# Patient Record
Sex: MALE | Race: WHITE | Employment: FULL TIME | URBAN - METROPOLITAN AREA
[De-identification: names, ages, dates, MRNs, and addresses within clinical notes are randomized per-mention and may not be internally consistent; named-entity substitution may affect disease eponyms.]

---

## 2022-03-19 PROBLEM — E78.5 DYSLIPIDEMIA: Status: ACTIVE | Noted: 2017-03-08

## 2022-04-04 PROBLEM — J31.0 CHRONIC RHINITIS: Status: ACTIVE | Noted: 2022-04-04

## 2022-04-04 PROBLEM — E29.1 ANDROGEN DEFICIENCY: Status: ACTIVE | Noted: 2022-04-04

## 2022-04-04 PROBLEM — E55.9 VITAMIN D DEFICIENCY: Status: ACTIVE | Noted: 2022-04-04

## 2022-04-04 PROBLEM — M54.50 LOW BACK PAIN: Status: ACTIVE | Noted: 2022-04-04

## 2022-04-04 PROBLEM — E78.00 HYPERCHOLESTEROLEMIA: Status: ACTIVE | Noted: 2022-04-04

## 2022-04-04 PROBLEM — G89.4 CHRONIC PAIN DISORDER: Status: ACTIVE | Noted: 2019-08-22

## 2022-04-04 PROBLEM — M43.16 SPONDYLOLISTHESIS OF LUMBAR REGION: Status: ACTIVE | Noted: 2019-10-29

## 2022-04-04 PROBLEM — F11.90 OPIOID USE DISORDER: Status: ACTIVE | Noted: 2022-02-16

## 2022-08-23 ENCOUNTER — CLINICAL DOCUMENTATION (OUTPATIENT)
Dept: ORTHOPEDIC SURGERY | Age: 52
End: 2022-08-23

## 2022-08-23 ENCOUNTER — TELEPHONE (OUTPATIENT)
Dept: ORTHOPEDIC SURGERY | Age: 52
End: 2022-08-23

## 2022-08-23 DIAGNOSIS — M47.816 SPONDYLOSIS WITHOUT MYELOPATHY OR RADICULOPATHY, LUMBAR REGION: Primary | ICD-10-CM

## 2022-08-23 NOTE — TELEPHONE ENCOUNTER
l    Name: Tammy Molina  YOB: 1970  Gender: male  MRN: [id number]      This patient has requested repeat bilateral lower lumbar facet injections. .    The most recent injection provided 80% pain reduction and improvement in functioning for at least 14 weeks. The patient's current pain scale is 8/10. .    As a result of the current recurrence of pain, the patient is having the following difficulties:  Difficulties with bathing and/or dressing  Difficulty sleeping at night  Difficulty transferring into or out of a car  Difficulty performing housework  Difficulty with sitting or performing work related activities    The patient has severe pain limiting function despite on-going, conservative, physician-guided treatment. The patient is currently regularly engaging in physician or PT directed lumbar spine exercies for 8 weeks.       Taz Bennett MD

## 2022-08-24 ENCOUNTER — CLINICAL DOCUMENTATION (OUTPATIENT)
Dept: ORTHOPEDIC SURGERY | Age: 52
End: 2022-08-24

## 2022-08-24 NOTE — TELEPHONE ENCOUNTER
Called and left message for patient to return my call and schedule repeat injections with [facility]. Ashutosh Geiger has been received and can schedule as early as 8/25.

## 2022-08-30 ENCOUNTER — TELEPHONE (OUTPATIENT)
Dept: ORTHOPEDIC SURGERY | Age: 52
End: 2022-08-30

## 2022-08-31 ENCOUNTER — CLINICAL DOCUMENTATION (OUTPATIENT)
Dept: ORTHOPEDIC SURGERY | Age: 52
End: 2022-08-31

## 2022-09-02 ENCOUNTER — OFFICE VISIT (OUTPATIENT)
Dept: ORTHOPEDIC SURGERY | Age: 52
End: 2022-09-02
Payer: COMMERCIAL

## 2022-09-02 DIAGNOSIS — M47.816 SPONDYLOSIS WITHOUT MYELOPATHY OR RADICULOPATHY, LUMBAR REGION: Primary | ICD-10-CM

## 2022-09-02 PROCEDURE — 64493 INJ PARAVERT F JNT L/S 1 LEV: CPT | Performed by: PHYSICAL MEDICINE & REHABILITATION

## 2022-09-02 PROCEDURE — 64494 INJ PARAVERT F JNT L/S 2 LEV: CPT | Performed by: PHYSICAL MEDICINE & REHABILITATION

## 2022-09-02 RX ORDER — TRIAMCINOLONE ACETONIDE 40 MG/ML
200 INJECTION, SUSPENSION INTRA-ARTICULAR; INTRAMUSCULAR ONCE
Status: COMPLETED | OUTPATIENT
Start: 2022-09-02 | End: 2022-09-02

## 2022-09-02 RX ADMIN — TRIAMCINOLONE ACETONIDE 200 MG: 40 INJECTION, SUSPENSION INTRA-ARTICULAR; INTRAMUSCULAR at 09:26

## 2022-09-02 NOTE — PROGRESS NOTES
Date: 09/02/22   Name: Anna Grady    Pre-Procedural Diagnosis:    Diagnosis Orders   1. Spondylosis without myelopathy or radiculopathy, lumbar region  FL INJ LUMB/SAC FACET SINGLE LEVEL    XR INJ FACET LUMB SACRAL 2ND LVL          Procedure: Bilateral Facet Joint Injections (2 levels)    Precautions:  [facility] Precautions spine injections: None. Patient denies any prior sensitivity to steroid, local anesthetic, contrast dye, iodine or shellfish. The procedure was discussed at length with the patient and informed consent was signed. The patient was placed in a prone position on the fluoroscopy table and the skin was prepped and draped in a routine sterile fashion. The areas to be injected were each anesthetized with approximately 2-3 cc of 1% Lidocaine. Initially a 22-gauge 3.5 inch inch spinal needle was carefully advanced under fluoroscopic guidance to the bilateral L4-L5 and L5-S1 facet joint spaces. 1 cc of 0.25% Marcaine and 50 mg of Kenalog were injected through the spinal needle at each site. Fluoroscopic guidance was used intermittently over a 10-minute period to insure proper needle placement and patient safety. A hard copy of the fluoroscopic  images has been placed in the patient's chart. The patient was monitored after the procedure and discharged home without complication.      Resume Meds:     N/A    Lacey Basurto MD  09/02/22

## 2022-12-05 ENCOUNTER — CLINICAL DOCUMENTATION (OUTPATIENT)
Dept: ORTHOPEDIC SURGERY | Age: 52
End: 2022-12-05

## 2022-12-05 ENCOUNTER — TELEPHONE (OUTPATIENT)
Dept: ORTHOPEDIC SURGERY | Age: 52
End: 2022-12-05

## 2022-12-05 DIAGNOSIS — M47.816 SPONDYLOSIS WITHOUT MYELOPATHY OR RADICULOPATHY, LUMBAR REGION: Primary | ICD-10-CM

## 2022-12-05 NOTE — TELEPHONE ENCOUNTER
l    Name: Edy Sadler  YOB: 1970  Gender: male  MRN: [id number]      This patient has requested repeat bilateral lumbar facet injections. .    The most recent injection provided 80% pain reduction and improvement in functioning for at least 3 months. The patient's current pain scale is 8/10. .    As a result of the current recurrence of pain, the patient is having the following difficulties:  Difficulties with bathing and/or dressing  Difficulty sleeping at night  Difficulty transferring into or out of a car  Difficulty performing housework  Difficulty with sitting or performing work related activities    The patient has severe pain limiting function despite on-going, conservative, physician-guided treatment. The patient is currently regularly engaging in physician or PT directed lumbar spine exercies.       Lu Noland MD

## 2022-12-05 NOTE — LETTER
[facility]  [address] [place] [id number]  Phone: [phone]  Fax: [phone]    Torri Kuhn MD        December 5, 2022    Rinku Ceron [place]  [address]      Dear Jennifer Rocha:       Name: Rinku Valenzuela  YOB: 1970  Gender: male  MRN: [id number]        This patient has requested repeat bilateral lumbar facet injections. ?? .     The most recent injection provided 80% pain reduction and improvement in functioning for at least 3 months.     The patient's current pain scale is 8/10.????? .     As a result of the current recurrence of pain, the patient is having the following difficulties:  Difficulties with bathing and/or dressing  Difficulty sleeping at night  Difficulty transferring into or out of a car  Difficulty performing housework  Difficulty with sitting or performing work related activities     The patient has severe pain limiting function despite on-going, conservative, physician-guided treatment.     The patient is currently regularly engaging in physician or PT directed lumbar spine exercies. If you have any questions or concerns, please don't hesitate to call.     Sincerely,        Torri Kuhn MD

## 2022-12-15 ENCOUNTER — OFFICE VISIT (OUTPATIENT)
Dept: ORTHOPEDIC SURGERY | Age: 52
End: 2022-12-15
Payer: COMMERCIAL

## 2022-12-15 DIAGNOSIS — M47.816 SPONDYLOSIS WITHOUT MYELOPATHY OR RADICULOPATHY, LUMBAR REGION: Primary | ICD-10-CM

## 2022-12-15 PROCEDURE — 64493 INJ PARAVERT F JNT L/S 1 LEV: CPT | Performed by: PHYSICAL MEDICINE & REHABILITATION

## 2022-12-15 PROCEDURE — 64494 INJ PARAVERT F JNT L/S 2 LEV: CPT | Performed by: PHYSICAL MEDICINE & REHABILITATION

## 2022-12-15 RX ORDER — TRIAMCINOLONE ACETONIDE 40 MG/ML
200 INJECTION, SUSPENSION INTRA-ARTICULAR; INTRAMUSCULAR ONCE
Status: COMPLETED | OUTPATIENT
Start: 2022-12-15 | End: 2022-12-15

## 2022-12-15 RX ADMIN — TRIAMCINOLONE ACETONIDE 200 MG: 40 INJECTION, SUSPENSION INTRA-ARTICULAR; INTRAMUSCULAR at 14:03

## 2022-12-15 NOTE — PROGRESS NOTES
Date: 12/15/22   Name: Audie Fung    Pre-Procedural Diagnosis:    Diagnosis Orders   1. Spondylosis without myelopathy or radiculopathy, lumbar region  FL INJ LUMB/SAC FACET SINGLE LEVEL    XR INJ FACET LUMB SACRAL 2ND LVL          Procedure: Bilateral Facet Joint Injections (2 levels)    Precautions:  [facility] Precautions spine injections: None. Patient denies any prior sensitivity to steroid, local anesthetic, contrast dye, iodine or shellfish. The procedure was discussed at length with the patient and informed consent was signed. The patient was placed in a prone position on the fluoroscopy table and the skin was prepped and draped in a routine sterile fashion. The areas to be injected were each anesthetized with approximately 2-3 cc of 1% Lidocaine. Initially a 22-gauge 3.5 inch inch spinal needle was carefully advanced under fluoroscopic guidance to the bilateral L4-L5 and L5-S1 facet joint spaces. 1 cc of 0.25% Marcaine and 50 mg of Kenalog were injected through the spinal needle at each site. Fluoroscopic guidance was used intermittently over a 10-minute period to insure proper needle placement and patient safety. A hard copy of the fluoroscopic  images has been placed in the patient's chart. The patient was monitored after the procedure and discharged home without complication.      Resume Meds:     N/A    Chris Sutherland MD  12/15/22

## 2023-03-06 ENCOUNTER — OFFICE VISIT (OUTPATIENT)
Dept: ORTHOPEDIC SURGERY | Age: 53
End: 2023-03-06
Payer: COMMERCIAL

## 2023-03-06 DIAGNOSIS — M54.50 LUMBAR BACK PAIN: ICD-10-CM

## 2023-03-06 DIAGNOSIS — M47.816 SPONDYLOSIS OF LUMBAR REGION WITHOUT MYELOPATHY OR RADICULOPATHY: Primary | ICD-10-CM

## 2023-03-06 PROCEDURE — 99213 OFFICE O/P EST LOW 20 MIN: CPT | Performed by: PHYSICAL MEDICINE & REHABILITATION

## 2023-03-06 NOTE — PROGRESS NOTES
Date:  03/06/23     HPI:  I am seeing Gallito Mandel in evalution/folowup. He is doing well at this time. He has pain in the lower lumbar paraspinal areas for which facet injections have offered an 90% pain reduction for 3 months or longer. His last set of injections were about 3 months ago and he is doing pretty well. He thinks the pain may come back in the next several weeks but we will see what happens. He has no radicular symptoms and no neurologic issues in the lower extremities. About 10 years ago he had a fall off of a ladder, noting along the way tramadol has produced GI side effects and hydrocodone may have a slight benefit. MR imaging about 10 years ago revealed degenerative changes but a more recent MRI revealed moderate spinal stenosis at several levels but he has done very well. ROS: No new issues since last being seen in the office setting 11 months ago    PE: Alert and cooperative. Good strength in the lower extremities. No lateralizing sensory findings. Has only mild tenderness in lower lumbar paraspinal areas. Ambulates without assistance    Assessment/Plan:  PREDOMINANTLY MUSCULOSKELETAL LUMBOSACRAL  SPINE PAIN. Facet joint arthropathy with excellent responses to facet injections intra-articularly. He would prefer to receive these for now, we have discussed an RFA procedure but he would want to hold off on that unless injections significantly lose patient benefit. He is aware they could offer a longer duration of benefit would be somewhat more technical to receive. I do not think there is a spine surgical issue. We do not need to reimage.     Rinku Reyes MD

## 2023-03-24 ENCOUNTER — CLINICAL DOCUMENTATION (OUTPATIENT)
Dept: ORTHOPEDIC SURGERY | Age: 53
End: 2023-03-24

## 2023-03-24 ENCOUNTER — TELEPHONE (OUTPATIENT)
Dept: ORTHOPEDIC SURGERY | Age: 53
End: 2023-03-24

## 2023-03-24 DIAGNOSIS — M47.816 SPONDYLOSIS WITHOUT MYELOPATHY OR RADICULOPATHY, LUMBAR REGION: ICD-10-CM

## 2023-03-24 DIAGNOSIS — M47.816 SPONDYLOSIS OF LUMBAR REGION WITHOUT MYELOPATHY OR RADICULOPATHY: Primary | ICD-10-CM

## 2023-03-24 DIAGNOSIS — M54.50 LUMBAR BACK PAIN: ICD-10-CM

## 2023-03-31 ENCOUNTER — OFFICE VISIT (OUTPATIENT)
Dept: ORTHOPEDIC SURGERY | Age: 53
End: 2023-03-31

## 2023-03-31 DIAGNOSIS — M47.816 SPONDYLOSIS OF LUMBAR REGION WITHOUT MYELOPATHY OR RADICULOPATHY: Primary | ICD-10-CM

## 2023-03-31 RX ORDER — TRIAMCINOLONE ACETONIDE 40 MG/ML
200 INJECTION, SUSPENSION INTRA-ARTICULAR; INTRAMUSCULAR ONCE
Status: COMPLETED | OUTPATIENT
Start: 2023-03-31 | End: 2023-03-31

## 2023-03-31 RX ADMIN — TRIAMCINOLONE ACETONIDE 200 MG: 40 INJECTION, SUSPENSION INTRA-ARTICULAR; INTRAMUSCULAR at 09:42

## 2023-03-31 NOTE — PROGRESS NOTES
Date: 03/31/23   Name: Rosilyn Mortimer    Pre-Procedural Diagnosis:    Diagnosis Orders   1. Spondylosis of lumbar region without myelopathy or radiculopathy            Procedure: Bilateral Facet Joint Injections (2 levels)    Precautions:  [facility] Precautions spine injections: None. Patient denies any prior sensitivity to steroid, local anesthetic, contrast dye, iodine or shellfish. The procedure was discussed at length with the patient and informed consent was signed. The patient was placed in a prone position on the fluoroscopy table and the skin was prepped and draped in a routine sterile fashion. The areas to be injected were each anesthetized with approximately 2-3 cc of 1% Lidocaine. Initially a 22-gauge 3.5 inch spinal needle was carefully advanced under fluoroscopic guidance to the bilateral L4-L5 and L5-S1 facet joint spaces. 1 cc of 0.25% Marcaine and 50 mg of Kenalog were injected through the spinal needle at each site. Fluoroscopic guidance was used intermittently over a 10-minute period to insure proper needle placement and patient safety. A hard copy of the fluoroscopic  images has been placed in the patient's chart. The patient was monitored after the procedure and discharged home without complication.      Resume Meds:     N/A      Kalie Pisano MD  03/31/23

## 2023-07-03 ENCOUNTER — CLINICAL DOCUMENTATION (OUTPATIENT)
Dept: ORTHOPEDIC SURGERY | Age: 53
End: 2023-07-03

## 2023-07-03 ENCOUNTER — TELEPHONE (OUTPATIENT)
Dept: ORTHOPEDIC SURGERY | Age: 53
End: 2023-07-03

## 2023-07-03 DIAGNOSIS — M47.816 SPONDYLOSIS OF LUMBAR REGION WITHOUT MYELOPATHY OR RADICULOPATHY: Primary | ICD-10-CM

## 2023-07-03 NOTE — TELEPHONE ENCOUNTER
Office/Clinical staff confirmed the PA/PDL paperwork has been faxed to the pharmacy. It is the responsibility of the filling pharmacy to obtain the prior auth. If the office has questions regarding this PA, please contact the patients pharmacy directly.    The EPA team will close out of this encounter at this time.      Scheduled spine injection per patient request at [facility]. He is scheduled on 7/13.  He is aware

## 2023-07-03 NOTE — PROGRESS NOTES
Name: Arelis Jimenez  YOB: 1970  Gender: male  MRN: [id number]        The most recent injection provided 90% pain reduction for at least 4 months. The patient's current pain scale is 8/10. .    As a result of the current recurrence of pain, the patient is having the following difficulties:  Doing his daily activities without pain. The patient has severe pain limiting function despite on-going, conservative, physician-guided treatment. The patient is currently doing home exercise program under Dr Torin Figueroa treatment with no relief.        Mignon Joseph [place]     8/5/8654

## 2023-07-13 ENCOUNTER — OFFICE VISIT (OUTPATIENT)
Dept: ORTHOPEDIC SURGERY | Age: 53
End: 2023-07-13

## 2023-07-13 DIAGNOSIS — M47.816 SPONDYLOSIS OF LUMBAR REGION WITHOUT MYELOPATHY OR RADICULOPATHY: Primary | ICD-10-CM

## 2023-07-13 RX ORDER — TRIAMCINOLONE ACETONIDE 40 MG/ML
200 INJECTION, SUSPENSION INTRA-ARTICULAR; INTRAMUSCULAR ONCE
Status: COMPLETED | OUTPATIENT
Start: 2023-07-13 | End: 2023-07-13

## 2023-07-13 RX ADMIN — TRIAMCINOLONE ACETONIDE 200 MG: 40 INJECTION, SUSPENSION INTRA-ARTICULAR; INTRAMUSCULAR at 15:44

## 2023-07-13 NOTE — PROGRESS NOTES
Date: 07/13/23   Name: Karyn Marcus    Pre-Procedural Diagnosis:    Diagnosis Orders   1. Spondylosis of lumbar region without myelopathy or radiculopathy  FL INJ LUMB/SAC FACET SINGLE LEVEL    XR INJ FACET LUMB SACRAL 2ND LVL          Procedure: Bilateral Facet Joint Injections (2 levels)    Precautions:  [facility] Precautions spine injections: None. Patient denies any prior sensitivity to steroid, local anesthetic, contrast dye, iodine or shellfish. The procedure was discussed at length with the patient and informed consent was signed. The patient was placed in a prone position on the fluoroscopy table and the skin was prepped and draped in a routine sterile fashion. The areas to be injected were each anesthetized with approximately 2-3 cc of 1% Lidocaine. Initially a 22-gauge 3.5 inch spinal needle was carefully advanced under fluoroscopic guidance to the bilateral L4-L5 and L5-S1 facet joint spaces. 1 cc of 0.25% Marcaine and 50 mg of Kenalog were injected through the spinal needle at each site. Fluoroscopic guidance was used intermittently over a 10-minute period to insure proper needle placement and patient safety. A hard copy of the fluoroscopic  images has been placed in the patient's chart. The patient was monitored after the procedure and discharged home without complication. A total of 5 cc of Kenalog were administered during this procedure.     Resume Meds:     N/A    Gertrude Major MD  07/13/23

## 2023-10-10 ENCOUNTER — TELEPHONE (OUTPATIENT)
Dept: ORTHOPEDIC SURGERY | Age: 53
End: 2023-10-10

## 2023-10-10 DIAGNOSIS — M47.816 SPONDYLOSIS OF LUMBAR REGION WITHOUT MYELOPATHY OR RADICULOPATHY: Primary | ICD-10-CM

## 2023-10-10 NOTE — TELEPHONE ENCOUNTER
Called and left  for patient; I went ahead and scheduled his injections for 10/19 GR 2:30pm with [facility]. If this doesn't work for him he will give us a call back to reschedule.

## 2023-10-10 NOTE — TELEPHONE ENCOUNTER
l    Name: Julisa Armenta  YOB: 1970  Gender: male  MRN: [id number]      This patient has requested repeat bilateral lower lumbar facet injections. The most recent injection provided 80% pain reduction and improvement in functioning for at least 3 months. The patient's current pain scale is 8/10. .    As a result of the current recurrence of pain, the patient is having the following difficulties:  Difficulties with bathing and/or dressing  Difficulty sleeping at night  Difficulty transferring into or out of a car  Difficulty performing housework  Difficulty with sitting or performing work related activities    The patient has severe pain limiting function despite on-going, conservative, physician-guided treatment. The patient is currently regularly engaging in physician or PT directed lumbar spine exercies.     Yuliya Perea MD

## 2023-10-19 ENCOUNTER — OFFICE VISIT (OUTPATIENT)
Dept: ORTHOPEDIC SURGERY | Age: 53
End: 2023-10-19
Payer: COMMERCIAL

## 2023-10-19 DIAGNOSIS — M47.816 SPONDYLOSIS OF LUMBAR REGION WITHOUT MYELOPATHY OR RADICULOPATHY: Primary | ICD-10-CM

## 2023-10-19 PROCEDURE — 64493 INJ PARAVERT F JNT L/S 1 LEV: CPT | Performed by: PHYSICAL MEDICINE & REHABILITATION

## 2023-10-19 PROCEDURE — 64494 INJ PARAVERT F JNT L/S 2 LEV: CPT | Performed by: PHYSICAL MEDICINE & REHABILITATION

## 2023-10-19 RX ORDER — TRIAMCINOLONE ACETONIDE 40 MG/ML
200 INJECTION, SUSPENSION INTRA-ARTICULAR; INTRAMUSCULAR ONCE
Status: COMPLETED | OUTPATIENT
Start: 2023-10-19 | End: 2023-10-19

## 2023-10-19 RX ADMIN — TRIAMCINOLONE ACETONIDE 200 MG: 40 INJECTION, SUSPENSION INTRA-ARTICULAR; INTRAMUSCULAR at 15:06

## 2023-10-19 NOTE — PROGRESS NOTES
Date: 10/19/23   Name: Lelia Castellanos    Pre-Procedural Diagnosis:    Diagnosis Orders   1. Spondylosis of lumbar region without myelopathy or radiculopathy  FL INJ LUMB/SAC FACET SINGLE LEVEL    XR INJ FACET LUMB SACRAL 2ND LVL    triamcinolone acetonide (KENALOG-40) injection 200 mg          Procedure: Bilateral Facet Joint Injections (2 levels)    Precautions:  [facility] Precautions spine injections: None. Patient denies any prior sensitivity to steroid, local anesthetic, contrast dye, iodine or shellfish. The procedure was discussed at length with the patient and informed consent was signed. The patient was placed in a prone position on the fluoroscopy table and the skin was prepped and draped in a routine sterile fashion. The areas to be injected were each anesthetized with approximately 2-3 cc of 1% Lidocaine. Initially a 22-gauge 3.5 inch spinal needle was carefully advanced under fluoroscopic guidance to the bilateral L4-L5 and L5-S1 facet joint spaces. 1 cc of 0.25% Marcaine and 50 mg of Kenalog were injected through the spinal needle at each site. Fluoroscopic guidance was used intermittently over a 10-minute period to insure proper needle placement and patient safety. A hard copy of the fluoroscopic  images has been placed in the patient's chart. The patient was monitored after the procedure and discharged home without complication. A total of 5 cc of Kenalog were administered during this procedure.     Resume Meds:     N/A    Son Poole MD  10/19/23

## 2024-01-10 ENCOUNTER — TELEPHONE (OUTPATIENT)
Dept: ORTHOPEDIC SURGERY | Age: 54
End: 2024-01-10

## 2024-01-10 DIAGNOSIS — M54.50 LUMBAR BACK PAIN: ICD-10-CM

## 2024-01-10 DIAGNOSIS — M47.816 SPONDYLOSIS OF LUMBAR REGION WITHOUT MYELOPATHY OR RADICULOPATHY: Primary | ICD-10-CM

## 2024-01-12 NOTE — TELEPHONE ENCOUNTER
Telephone call returned appointment scheduled and precert sent       This patient has requested repeat bilateral lower lumbar facet injections.     The most recent injection provided 80% pain reduction and improvement in functioning for at least 3 months.     The patient's current pain scale is 8/10.     As a result of the current recurrence of pain, the patient is having the following difficulties:  Difficulties with bathing and/or dressing  Difficulty sleeping at night  Difficulty transferring into or out of a car  Difficulty performing housework  Difficulty with sitting or performing work related activities     The patient has severe pain limiting function despite on-going, conservative, physician-guided treatment.     The patient is currently regularly engaging in physician or PT directed lumbar spine exercies.

## 2024-01-26 ENCOUNTER — OFFICE VISIT (OUTPATIENT)
Dept: ORTHOPEDIC SURGERY | Age: 54
End: 2024-01-26

## 2024-01-26 VITALS — HEIGHT: 71 IN | WEIGHT: 188 LBS | BODY MASS INDEX: 26.32 KG/M2

## 2024-01-26 DIAGNOSIS — M47.816 LUMBAR SPONDYLOSIS: Primary | ICD-10-CM

## 2024-01-26 RX ORDER — TRIAMCINOLONE ACETONIDE 40 MG/ML
200 INJECTION, SUSPENSION INTRA-ARTICULAR; INTRAMUSCULAR ONCE
Status: COMPLETED | OUTPATIENT
Start: 2024-01-26 | End: 2024-01-26

## 2024-01-26 RX ADMIN — TRIAMCINOLONE ACETONIDE 200 MG: 40 INJECTION, SUSPENSION INTRA-ARTICULAR; INTRAMUSCULAR at 10:34

## 2024-01-26 NOTE — PROGRESS NOTES
Date: 01/26/24   Name: Ashley Persaud    Pre-Procedural Diagnosis:    Diagnosis Orders   1. Lumbar spondylosis  FL INJ LUMB/SAC FACET SINGLE LEVEL    XR INJ FACET LUMB SACRAL 2ND LVL    triamcinolone acetonide (KENALOG-40) injection 200 mg          Procedure: Bilateral Facet Joint Injections (2 levels)    Precautions:  [facility] Precautions spine injections: None.  Patient denies any prior sensitivity to steroid, local anesthetic, contrast dye, iodine or shellfish.    The procedure was discussed at length with the patient and informed consent was signed. The patient was placed in a prone position on the fluoroscopy table and the skin was prepped and draped in a routine sterile fashion. The areas to be injected were each anesthetized with approximately 2-3 cc of 1% Lidocaine. Initially a 22-gauge 3.5 inch spinal needle was carefully advanced under fluoroscopic guidance to the bilateral L4-L5 and L5-S1 facet joint spaces. 1 cc of 0.25% Marcaine and 50 mg of Kenalog were injected through the spinal needle at each site.     Fluoroscopic guidance was used intermittently over a 10-minute period to insure proper needle placement and patient safety. A hard copy of the fluoroscopic  images has been placed in the patient's chart. The patient was monitored after the procedure and discharged home without complication.     A total of 5 cc of Kenalog were administered during this procedure.    Resume Meds:     N/A    L JOSESITO PEREYRA JR, MD  01/26/24

## 2024-02-26 ENCOUNTER — OFFICE VISIT (OUTPATIENT)
Dept: ORTHOPEDIC SURGERY | Age: 54
End: 2024-02-26
Payer: COMMERCIAL

## 2024-02-26 VITALS — WEIGHT: 188 LBS | BODY MASS INDEX: 26.32 KG/M2 | HEIGHT: 71 IN

## 2024-02-26 DIAGNOSIS — M47.816 SPONDYLOSIS OF LUMBAR REGION WITHOUT MYELOPATHY OR RADICULOPATHY: Primary | ICD-10-CM

## 2024-02-26 DIAGNOSIS — M60.9 MYOSITIS, UNSPECIFIED MYOSITIS TYPE, UNSPECIFIED SITE: ICD-10-CM

## 2024-02-26 PROCEDURE — 99213 OFFICE O/P EST LOW 20 MIN: CPT | Performed by: PHYSICAL MEDICINE & REHABILITATION

## 2024-02-26 RX ORDER — GABAPENTIN 300 MG/1
1 CAPSULE ORAL
COMMUNITY

## 2024-02-26 RX ORDER — CETIRIZINE HYDROCHLORIDE 10 MG/1
10 TABLET ORAL DAILY
COMMUNITY
Start: 2024-01-08

## 2024-02-26 RX ORDER — PHENTERMINE HYDROCHLORIDE 37.5 MG/1
37.5 TABLET ORAL
COMMUNITY
Start: 2024-01-31

## 2024-02-26 RX ORDER — SILDENAFIL 100 MG/1
TABLET, FILM COATED ORAL
COMMUNITY

## 2024-02-26 NOTE — PROGRESS NOTES
Date:  02/26/24     HPI:  I am seeing Ashley Persaud in evalution/folowup.  I saw him in the office setting a year ago.  He has pain in the lower lumbar paraspinal areas for which facet joint injections offer 90% pain reduction for around 3 months.  His track record with these is good.  Last set of injections were about a month ago he is doing very well from them.  The pain is typically in the lower lumbar paraspinal areas.  No radicular symptoms and no neurologic issues in the lower extremities.    When he needs reinjection, the pain level is typically 7-8/10.  He continues with provider directed lumbar spine exercises several days a week in addition to an exercise program.  When the pain is more noted he has trouble bathing, cleaning, dressing, getting into out of a car, sleeping, doing type of functioning.  When injections are helping he functions much better is very pleased with the results.    He continues on Suboxone.  He is also on phentermine as well and that is for weight loss.  He takes this as needed.  Tells me if he were to get up to a weight of around 240 pounds he has a lot more trouble with his lumbar spine.  Right now he is around 200 pounds and is managing that.    ROS: Unaware of any new problems within the last year.    PE: Alert and cooperative.  Good strength lower extremities.  No lateralizing sensory findings.  He has tenderness in the lower lumbar paraspinal areas.  Good range of motion of the hips.    Assessment/Plan:       PREDOMINANTLY MUSCULOSKELETAL LUMBOSACRAL  SPINE PAIN.  Facet joint arthropathy with very nice responses to intra-articular facet joint injections.  Will repeat those as needed, continue his provider directed lumbar spine exercises.  He may be a candidate for an RFA procedure down the road but right now would very much like to maintain the current plan since he has significant benefit and feels comfortable with this particular procedure for the time being.  He is

## 2024-04-26 ENCOUNTER — TELEPHONE (OUTPATIENT)
Dept: ORTHOPEDIC SURGERY | Age: 54
End: 2024-04-26

## 2024-04-26 DIAGNOSIS — M47.816 SPONDYLOSIS OF LUMBAR REGION WITHOUT MYELOPATHY OR RADICULOPATHY: Primary | ICD-10-CM

## 2024-04-26 NOTE — TELEPHONE ENCOUNTER
l    Name: Ashley Persaud  YOB: 1970  Gender: male  MRN: [id number]      This patient has requested repeat bilateral lower lumbar facet injections.    The most recent injection provided 85% pain reduction and improvement in functioning for at least 3 months.    The patient's current pain scale is 8/10..    As a result of the current recurrence of pain, the patient is having the following difficulties:  Difficulties with bathing and/or dressing  Difficulty sleeping at night  Difficulty transferring into or out of a car  Difficulty performing housework  Difficulty with sitting or performing work related activities    The patient has severe pain limiting function despite on-going, conservative, physician-guided treatment.    The patient is currently regularly engaging in physician or PT directed lumbar spine exercies.      Nathaniel Lockwood MD

## 2024-05-06 ENCOUNTER — OFFICE VISIT (OUTPATIENT)
Dept: ORTHOPEDIC SURGERY | Age: 54
End: 2024-05-06
Payer: COMMERCIAL

## 2024-05-06 DIAGNOSIS — M47.816 SPONDYLOSIS OF LUMBAR REGION WITHOUT MYELOPATHY OR RADICULOPATHY: Primary | ICD-10-CM

## 2024-05-06 PROCEDURE — 64493 INJ PARAVERT F JNT L/S 1 LEV: CPT | Performed by: PHYSICAL MEDICINE & REHABILITATION

## 2024-05-06 PROCEDURE — 64494 INJ PARAVERT F JNT L/S 2 LEV: CPT | Performed by: PHYSICAL MEDICINE & REHABILITATION

## 2024-05-06 RX ORDER — TRIAMCINOLONE ACETONIDE 40 MG/ML
200 INJECTION, SUSPENSION INTRA-ARTICULAR; INTRAMUSCULAR ONCE
Status: COMPLETED | OUTPATIENT
Start: 2024-05-06 | End: 2024-05-06

## 2024-05-06 RX ADMIN — TRIAMCINOLONE ACETONIDE 200 MG: 40 INJECTION, SUSPENSION INTRA-ARTICULAR; INTRAMUSCULAR at 13:36

## 2024-05-06 NOTE — PROGRESS NOTES
Date: 05/06/24   Name: Ashley Persaud    Pre-Procedural Diagnosis:    Diagnosis Orders   1. Spondylosis of lumbar region without myelopathy or radiculopathy  FL INJ LUMB/SAC FACET SINGLE LEVEL    XR INJ FACET LUMB SACRAL 2ND LVL    triamcinolone acetonide (KENALOG-40) injection 200 mg          Procedure: Bilateral Facet Joint Injections (2 levels)    Precautions:  [facility] Precautions spine injections: None.  Patient denies any prior sensitivity to steroid, local anesthetic, contrast dye, iodine or shellfish.    The procedure was discussed at length with the patient and informed consent was signed. The patient was placed in a prone position on the fluoroscopy table and the skin was prepped and draped in a routine sterile fashion. The areas to be injected were each anesthetized with approximately 2-3 cc of 1% Lidocaine. Initially a 22-gauge 3.5 inch spinal needle was carefully advanced under fluoroscopic guidance to the bilateral L4-L5 and L5-S1 facet joint spaces. 1 cc of 0.25% Marcaine and 50 mg of Kenalog were injected through the spinal needle at each site.     Fluoroscopic guidance was used intermittently over a 10-minute period to insure proper needle placement and patient safety. A hard copy of the fluoroscopic  images has been placed in the patient's chart. The patient was monitored after the procedure and discharged home without complication.     A total of 5 cc of Kenalog were administered during this procedure.    Resume Meds:     N/A    L JOSESITO PEREYRA JR, MD  05/06/24

## 2024-08-05 ENCOUNTER — TELEPHONE (OUTPATIENT)
Dept: ORTHOPEDIC SURGERY | Age: 54
End: 2024-08-05

## 2024-08-05 DIAGNOSIS — M47.816 SPONDYLOSIS OF LUMBAR REGION WITHOUT MYELOPATHY OR RADICULOPATHY: Primary | ICD-10-CM

## 2024-08-06 NOTE — TELEPHONE ENCOUNTER
l    Name: Ashley Persaud  YOB: 1970  Gender: male  MRN: [id number]      This patient has requested repeat facet injections.    The most recent injection provided 85% pain reduction and improvement in functioning for at least 3 months.    The patient's current pain scale is 8/10..    As a result of the current recurrence of pain, the patient is having the following difficulties:  Difficulties with bathing and/or dressing  Difficulty sleeping at night  Difficulty transferring into or out of a car  Difficulty performing housework  Difficulty with sitting or performing work related activities    The patient has severe pain limiting function despite on-going, conservative, physician-guided treatment.    The patient is currently regularly engaging in physician or PT directed lumbar spine exercies.    Nathaniel Lockwood MD

## 2024-08-06 NOTE — TELEPHONE ENCOUNTER
Called and scheduled patient for repeat injections with [facility]  8/15 GR 1:45pm

## 2024-08-20 ENCOUNTER — TELEPHONE (OUTPATIENT)
Dept: ORTHOPEDIC SURGERY | Age: 54
End: 2024-08-20

## 2024-08-20 NOTE — TELEPHONE ENCOUNTER
Patient received a message but accidentally deleted it and asks for a return call please to let him know why his appointment was canceled today.

## 2024-08-22 ENCOUNTER — TELEPHONE (OUTPATIENT)
Dept: ORTHOPEDIC SURGERY | Age: 54
End: 2024-08-22

## 2024-09-05 ENCOUNTER — TELEPHONE (OUTPATIENT)
Dept: ORTHOPEDIC SURGERY | Age: 54
End: 2024-09-05

## 2024-09-06 ENCOUNTER — OFFICE VISIT (OUTPATIENT)
Dept: ORTHOPEDIC SURGERY | Age: 54
End: 2024-09-06

## 2024-09-06 DIAGNOSIS — M47.816 SPONDYLOSIS OF LUMBAR REGION WITHOUT MYELOPATHY OR RADICULOPATHY: Primary | ICD-10-CM

## 2024-09-06 RX ORDER — TRIAMCINOLONE ACETONIDE 40 MG/ML
200 INJECTION, SUSPENSION INTRA-ARTICULAR; INTRAMUSCULAR ONCE
Status: COMPLETED | OUTPATIENT
Start: 2024-09-06 | End: 2024-09-06

## 2024-09-06 RX ADMIN — TRIAMCINOLONE ACETONIDE 200 MG: 40 INJECTION, SUSPENSION INTRA-ARTICULAR; INTRAMUSCULAR at 11:17

## 2024-09-06 NOTE — PROGRESS NOTES
intermittently over a 10-minute period to insure proper needle placement and patient safety. A hard copy of the fluoroscopic  images has been placed in the patient's chart. The patient was monitored after the procedure and discharged home without complication.     A total of 5 cc of Kenalog were administered during this procedure.    Resume Meds:     N/A    CECILIA PEREYRA JR, MD  09/06/24

## 2025-01-14 ENCOUNTER — TELEPHONE (OUTPATIENT)
Dept: ORTHOPEDIC SURGERY | Age: 55
End: 2025-01-14

## 2025-01-14 DIAGNOSIS — M47.816 SPONDYLOSIS OF LUMBAR REGION WITHOUT MYELOPATHY OR RADICULOPATHY: Primary | ICD-10-CM

## 2025-01-14 NOTE — TELEPHONE ENCOUNTER
Called and scheduled patient for repeat injections with [facility]  1/27  GR  1:30pm

## 2025-01-14 NOTE — TELEPHONE ENCOUNTER
l    Name: Ashley Persaud  YOB: 1970  Gender: male  MRN: [id number]      This patient has requested repeat lumbar facet injections.    The most recent injection provided 85% pain reduction and improvement in functioning for at least 4 months.    The patient's current pain scale is 8/10..    As a result of the current recurrence of pain, the patient is having the following difficulties:  Difficulties with bathing and/or dressing  Difficulty sleeping at night  Difficulty transferring into or out of a car  Difficulty performing housework  Difficulty with sitting or performing work related activities    The patient has severe pain limiting function despite on-going, conservative, physician-guided treatment.    The patient is currently regularly engaging in physician or PT directed lumbar spine exercies.      Nathaniel Lockwood MD

## 2025-01-24 ENCOUNTER — TELEPHONE (OUTPATIENT)
Dept: ORTHOPEDIC SURGERY | Age: 55
End: 2025-01-24

## 2025-01-24 NOTE — TELEPHONE ENCOUNTER
A voice mail was left for the patient letting him know his injection appt for Monday has to be cancelled due to his insurance auth is still pending.

## 2025-01-27 ENCOUNTER — TELEPHONE (OUTPATIENT)
Dept: ORTHOPEDIC SURGERY | Age: 55
End: 2025-01-27

## 2025-01-29 ENCOUNTER — TELEPHONE (OUTPATIENT)
Dept: ORTHOPEDIC SURGERY | Age: 55
End: 2025-01-29

## 2025-01-30 ENCOUNTER — OFFICE VISIT (OUTPATIENT)
Dept: ORTHOPEDIC SURGERY | Age: 55
End: 2025-01-30
Payer: MEDICARE

## 2025-01-30 DIAGNOSIS — M47.816 SPONDYLOSIS OF LUMBAR REGION WITHOUT MYELOPATHY OR RADICULOPATHY: Primary | ICD-10-CM

## 2025-01-30 PROCEDURE — 64494 INJ PARAVERT F JNT L/S 2 LEV: CPT | Performed by: PHYSICAL MEDICINE & REHABILITATION

## 2025-01-30 PROCEDURE — 64493 INJ PARAVERT F JNT L/S 1 LEV: CPT | Performed by: PHYSICAL MEDICINE & REHABILITATION

## 2025-01-30 RX ORDER — TRIAMCINOLONE ACETONIDE 40 MG/ML
40 INJECTION, SUSPENSION INTRA-ARTICULAR; INTRAMUSCULAR ONCE
Status: COMPLETED | OUTPATIENT
Start: 2025-01-30 | End: 2025-01-30

## 2025-01-30 RX ADMIN — TRIAMCINOLONE ACETONIDE 40 MG: 40 INJECTION, SUSPENSION INTRA-ARTICULAR; INTRAMUSCULAR at 14:13

## 2025-01-30 NOTE — PROGRESS NOTES
Date: 01/30/25   Name: Ashley Persaud    Pre-Procedural Diagnosis:    Diagnosis Orders   1. Spondylosis of lumbar region without myelopathy or radiculopathy  FL INJ LUMB/SAC FACET SINGLE LEVEL    XR INJ FACET LUMB SACRAL 2ND LVL    triamcinolone acetonide (KENALOG-40) injection 40 mg          Procedure: Bilateral Facet Joint Injections (2 levels)    I have reviewed prior lumbar spine radiographs that reveal 5 non rib-bearing lumbar vertebrae. and I have personally reviewed with patient the informed consent for operation/procedure per [facility] protocol.  This involves risks and benefits of procedure, potential for success/improvement of injections,qualifications of physician performing procedure.  Consent form addressed appropriate local anesthesia, emergent blood transfusion, clarification of DNR status.  This form was signed by all appropriate parties and scanned into the medical record. Note that is not appropriate for me to discuss spine surgical issues or other treatment options if I am not the primary clinician.  If I am the ordering clinician, those issues would have been discussed at the appropriate office visit or at upcoming encounter.     Precautions:  [facility] Precautions spine injections: None.  Patient denies any prior sensitivity to steroid, local anesthetic, contrast dye, iodine or shellfish.    The procedure was discussed at length with the patient and informed consent was signed. The patient was placed in a prone position on the fluoroscopy table and the skin was prepped and draped in a routine sterile fashion. The areas to be injected were each anesthetized with approximately 2-3 cc of 1% Lidocaine. Initially a 22-gauge 3.5 inch spinal needle was carefully advanced under fluoroscopic guidance to the bilateral L4-L5 and L5-S1 facet joint spaces. 1 cc of 0.25% Marcaine and 50 mg of Kenalog were injected through the spinal needle at each site.     Fluoroscopic guidance was used intermittently

## 2025-04-07 ENCOUNTER — OFFICE VISIT (OUTPATIENT)
Dept: ORTHOPEDIC SURGERY | Age: 55
End: 2025-04-07
Payer: MEDICARE

## 2025-04-07 DIAGNOSIS — M79.10 MYALGIA, UNSPECIFIED SITE: ICD-10-CM

## 2025-04-07 DIAGNOSIS — M47.816 SPONDYLOSIS OF LUMBAR REGION WITHOUT MYELOPATHY OR RADICULOPATHY: Primary | ICD-10-CM

## 2025-04-07 DIAGNOSIS — M54.50 LUMBAR BACK PAIN: ICD-10-CM

## 2025-04-07 PROCEDURE — 99213 OFFICE O/P EST LOW 20 MIN: CPT | Performed by: PHYSICAL MEDICINE & REHABILITATION

## 2025-04-07 NOTE — PROGRESS NOTES
procedure and does not get much benefit or has problems with that that would facilitate getting facet injections on a more typical 3-month schedule.  This remains to be seen.  We do not need to reimage.     2.        Elements of this note/dictation were created using speech recognition software.  As a result, some errors of speech recognition may be noted throughout the narrative.    CECILIA PREEYRA JR, MD

## 2025-05-13 ENCOUNTER — TELEPHONE (OUTPATIENT)
Dept: ORTHOPEDIC SURGERY | Age: 55
End: 2025-05-13

## 2025-05-13 DIAGNOSIS — M47.816 SPONDYLOSIS OF LUMBAR REGION WITHOUT MYELOPATHY OR RADICULOPATHY: Primary | ICD-10-CM

## 2025-05-22 ENCOUNTER — OFFICE VISIT (OUTPATIENT)
Dept: ORTHOPEDIC SURGERY | Age: 55
End: 2025-05-22

## 2025-05-22 DIAGNOSIS — M47.816 SPONDYLOSIS OF LUMBAR REGION WITHOUT MYELOPATHY OR RADICULOPATHY: Primary | ICD-10-CM

## 2025-05-22 RX ORDER — TRIAMCINOLONE ACETONIDE 40 MG/ML
40 INJECTION, SUSPENSION INTRA-ARTICULAR; INTRAMUSCULAR ONCE
Status: COMPLETED | OUTPATIENT
Start: 2025-05-22 | End: 2025-05-22

## 2025-05-22 RX ADMIN — TRIAMCINOLONE ACETONIDE 40 MG: 40 INJECTION, SUSPENSION INTRA-ARTICULAR; INTRAMUSCULAR at 15:15

## 2025-05-22 NOTE — PROGRESS NOTES
Date: 05/22/25   Name: Ashley Persaud    Pre-Procedural Diagnosis:    Diagnosis Orders   1. Spondylosis of lumbar region without myelopathy or radiculopathy  FL INJ LUMB/SAC FACET SINGLE LEVEL    XR INJ FACET LUMB SACRAL 2ND LVL    triamcinolone acetonide (KENALOG-40) injection 40 mg          Procedure: Bilateral Facet Joint Injections (2 levels)    I have reviewed prior lumbar spine radiographs that reveal 5 non rib-bearing lumbar vertebrae. and I have personally reviewed with patient the informed consent for operation/procedure per [facility] protocol.  This involves risks and benefits of procedure, potential for success/improvement of injections,qualifications of physician performing procedure.  Consent form addressed appropriate local anesthesia.  This form was signed by all appropriate parties and scanned into the medical record. Note that is not appropriate for me to discuss spine surgical issues or other treatment options if I am not the primary clinician.  If I am the ordering clinician, those issues would have been discussed at the appropriate office visit or at upcoming encounter.     Precautions:  [facility] Precautions spine injections: None.  Patient denies any prior sensitivity to steroid, local anesthetic, contrast dye, iodine or shellfish.    The procedure was discussed at length with the patient and informed consent was signed. The patient was placed in a prone position on the fluoroscopy table and the skin was prepped and draped in a routine sterile fashion. The areas to be injected were each anesthetized with approximately 2-3 cc of 1% Lidocaine. Initially a 22-gauge 3.5 inch spinal needle was carefully advanced under fluoroscopic guidance to the bilateral L4-L5 and L5-S1 facet joint spaces. 1 cc of 0.25% Marcaine and 50 mg of Kenalog were injected through the spinal needle at each site.     Fluoroscopic guidance was used intermittently over a 10-minute period to insure proper needle placement

## 2025-07-31 ENCOUNTER — TELEPHONE (OUTPATIENT)
Dept: ORTHOPEDIC SURGERY | Age: 55
End: 2025-07-31

## 2025-08-05 DIAGNOSIS — M47.816 SPONDYLOSIS OF LUMBAR REGION WITHOUT MYELOPATHY OR RADICULOPATHY: Primary | ICD-10-CM

## 2025-08-12 ENCOUNTER — TELEPHONE (OUTPATIENT)
Dept: ORTHOPEDIC SURGERY | Age: 55
End: 2025-08-12

## 2025-08-12 DIAGNOSIS — M47.816 SPONDYLOSIS OF LUMBAR REGION WITHOUT MYELOPATHY OR RADICULOPATHY: Primary | ICD-10-CM

## 2025-08-18 ENCOUNTER — OFFICE VISIT (OUTPATIENT)
Dept: ORTHOPEDIC SURGERY | Age: 55
End: 2025-08-18
Payer: MEDICARE

## 2025-08-18 DIAGNOSIS — M47.816 SPONDYLOSIS OF LUMBAR REGION WITHOUT MYELOPATHY OR RADICULOPATHY: ICD-10-CM

## 2025-08-18 DIAGNOSIS — M79.10 MYALGIA, UNSPECIFIED SITE: ICD-10-CM

## 2025-08-18 DIAGNOSIS — M54.50 LUMBAR BACK PAIN: Primary | ICD-10-CM

## 2025-08-18 PROCEDURE — 64494 INJ PARAVERT F JNT L/S 2 LEV: CPT | Performed by: PHYSICAL MEDICINE & REHABILITATION

## 2025-08-18 PROCEDURE — 20552 NJX 1/MLT TRIGGER POINT 1/2: CPT | Performed by: PHYSICAL MEDICINE & REHABILITATION

## 2025-08-18 PROCEDURE — 64493 INJ PARAVERT F JNT L/S 1 LEV: CPT | Performed by: PHYSICAL MEDICINE & REHABILITATION

## 2025-08-18 RX ORDER — TRIAMCINOLONE ACETONIDE 40 MG/ML
40 INJECTION, SUSPENSION INTRA-ARTICULAR; INTRAMUSCULAR ONCE
Status: COMPLETED | OUTPATIENT
Start: 2025-08-18 | End: 2025-08-18

## 2025-08-18 RX ADMIN — TRIAMCINOLONE ACETONIDE 40 MG: 40 INJECTION, SUSPENSION INTRA-ARTICULAR; INTRAMUSCULAR at 15:33
